# Patient Record
Sex: MALE | Race: BLACK OR AFRICAN AMERICAN | Employment: UNEMPLOYED | URBAN - METROPOLITAN AREA
[De-identification: names, ages, dates, MRNs, and addresses within clinical notes are randomized per-mention and may not be internally consistent; named-entity substitution may affect disease eponyms.]

---
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
## 2020-12-15 ENCOUNTER — HOSPITAL ENCOUNTER (EMERGENCY)
Age: 20
Discharge: HOME | End: 2020-12-15
Attending: EMERGENCY MEDICINE

## 2020-12-15 VITALS
DIASTOLIC BLOOD PRESSURE: 77 MMHG | RESPIRATION RATE: 16 BRPM | SYSTOLIC BLOOD PRESSURE: 128 MMHG | HEIGHT: 75 IN | HEART RATE: 86 BPM | OXYGEN SATURATION: 99 % | BODY MASS INDEX: 28.1 KG/M2 | WEIGHT: 225.97 LBS | TEMPERATURE: 99.1 F

## 2020-12-15 LAB
BILIRUBIN URINE: NEGATIVE
COLOR: ABNORMAL
COMMENT UA: ABNORMAL
GLUCOSE URINE: NEGATIVE
HIV AG/AB: NONREACTIVE
KETONES, URINE: NEGATIVE
LEUKOCYTE ESTERASE, URINE: NEGATIVE
NITRITE, URINE: NEGATIVE
PH UA: 6 (ref 5–8)
PROTEIN UA: NEGATIVE
SPECIFIC GRAVITY UA: 1.03 (ref 1–1.03)
T. PALLIDUM, IGG: NONREACTIVE
TURBIDITY: ABNORMAL
URINE HGB: NEGATIVE
UROBILINOGEN, URINE: NORMAL

## 2020-12-15 PROCEDURE — 86780 TREPONEMA PALLIDUM: CPT

## 2020-12-15 PROCEDURE — 6360000002 HC RX W HCPCS: Performed by: STUDENT IN AN ORGANIZED HEALTH CARE EDUCATION/TRAINING PROGRAM

## 2020-12-15 PROCEDURE — 87389 HIV-1 AG W/HIV-1&-2 AB AG IA: CPT

## 2020-12-15 PROCEDURE — 96372 THER/PROPH/DIAG INJ SC/IM: CPT

## 2020-12-15 PROCEDURE — 81001 URINALYSIS AUTO W/SCOPE: CPT

## 2020-12-15 PROCEDURE — 99284 EMERGENCY DEPT VISIT MOD MDM: CPT

## 2020-12-15 PROCEDURE — 87591 N.GONORRHOEAE DNA AMP PROB: CPT

## 2020-12-15 PROCEDURE — 6370000000 HC RX 637 (ALT 250 FOR IP): Performed by: STUDENT IN AN ORGANIZED HEALTH CARE EDUCATION/TRAINING PROGRAM

## 2020-12-15 PROCEDURE — 87491 CHLMYD TRACH DNA AMP PROBE: CPT

## 2020-12-15 RX ORDER — AZITHROMYCIN 250 MG/1
1000 TABLET, FILM COATED ORAL ONCE
Status: COMPLETED | OUTPATIENT
Start: 2020-12-15 | End: 2020-12-15

## 2020-12-15 RX ORDER — CEFTRIAXONE SODIUM 250 MG/1
250 INJECTION, POWDER, FOR SOLUTION INTRAMUSCULAR; INTRAVENOUS ONCE
Status: COMPLETED | OUTPATIENT
Start: 2020-12-15 | End: 2020-12-15

## 2020-12-15 RX ORDER — METRONIDAZOLE 500 MG/1
2000 TABLET ORAL ONCE
Status: COMPLETED | OUTPATIENT
Start: 2020-12-15 | End: 2020-12-15

## 2020-12-15 RX ADMIN — METRONIDAZOLE 2000 MG: 500 TABLET, FILM COATED ORAL at 23:32

## 2020-12-15 RX ADMIN — CEFTRIAXONE SODIUM 250 MG: 250 INJECTION, POWDER, FOR SOLUTION INTRAMUSCULAR; INTRAVENOUS at 22:30

## 2020-12-15 RX ADMIN — AZITHROMYCIN 1000 MG: 250 TABLET, FILM COATED ORAL at 22:29

## 2020-12-16 LAB
-: NORMAL
AMORPHOUS: NORMAL
BACTERIA: NORMAL
C. TRACHOMATIS DNA ,URINE: NEGATIVE
CASTS UA: NORMAL /LPF (ref 0–8)
CRYSTALS, UA: NORMAL /HPF
EPITHELIAL CELLS UA: NORMAL /HPF (ref 0–5)
MUCUS: NORMAL
N. GONORRHOEAE DNA, URINE: NEGATIVE
OTHER OBSERVATIONS UA: NORMAL
RBC UA: NORMAL /HPF (ref 0–4)
RENAL EPITHELIAL, UA: NORMAL /HPF
SPECIMEN DESCRIPTION: NORMAL
TRICHOMONAS: NORMAL
WBC UA: NORMAL /HPF (ref 0–5)
YEAST: NORMAL

## 2020-12-16 NOTE — ED PROVIDER NOTES
[facility]     Emergency Department     Faculty Note/ Attestation      Pt Name: Colin Linares                                       MRN: [id number]  [place] 2000  Date of evaluation: 12/15/2020    Patients PCP:    No primary care provider on file. Attestation  I performed a history and physical examination of the patient and discussed management with the resident. I reviewed the residents note and agree with the documented findings and plan of care. Any areas of disagreement are noted on the chart. I was personally present for the key portions of any procedures. I have documented in the chart those procedures where I was not present during the key portions. I have reviewed the emergency nurses triage note. I agree with the chief complaint, past medical history, past surgical history, allergies, medications, social and family history as documented unless otherwise noted below. For Physician Assistant/ Nurse Practitioner cases/documentation I have personally evaluated this patient and have completed at least one if not all key elements of the E/M (history, physical exam, and MDM). Additional findings are as noted. Initial Screens:        [place] Coma Scale  Eye Opening: Spontaneous  Best Verbal Response: Oriented  Best Motor Response: Obeys commands  Eric Coma Scale Score: 15    Vitals:    Vitals:    12/15/20 2053 12/15/20 2113   BP:  128/77   Pulse:  86   Resp:  16   Temp: 97.2 °F (36.2 °C) 99.1 °F (37.3 °C)   TempSrc: Temporal Oral   SpO2:  99%   Weight:  225 lb 15.5 oz (102.5 kg)   Height:  6' 3\" (1.905 m)       CHIEF COMPLAINT       Chief Complaint   Patient presents with    Exposure to STD     Exposed to HIV wants to get tested       Patient is a 78-year-old male sexually active with men and on prophylaxis for HIV.   Patient notes he wants to get tested for all STDs and treated concern for HIV currently denying any symptoms of pain burning discomfort with urination no ulcers but does have bruises on the skin and has experienced candida in the past    DIAGNOSTIC RESULTS     RADIOLOGY:   No orders to display       LABS:  Labs Reviewed - No data to display    EMERGENCY DEPARTMENT COURSE:     -------------------------  BP: 128/77, Temp: 99.1 °F (37.3 °C), Pulse: 86, Resp: 16  Physical Exam  Constitutional:       Appearance: He is well-developed. He is not diaphoretic. HENT:      Head: Normocephalic and atraumatic. Right Ear: External ear normal.      Left Ear: External ear normal.   Eyes:      General: No scleral icterus. Right eye: No discharge. Left eye: No discharge. Neck:      Musculoskeletal: Normal range of motion. Trachea: No tracheal deviation. Pulmonary:      Effort: Pulmonary effort is normal. No respiratory distress. Breath sounds: No stridor. Musculoskeletal: Normal range of motion. Skin:     General: Skin is warm and dry. Neurological:      Mental Status: He is alert and oriented to person, place, and time. Coordination: Coordination normal.   Psychiatric:      Comments: Depressed mood no SI           Comments  Patient arrived hemodynamically stable and in no acute distress. Concern for STD given patient's presentation. No evidence of systemic disease. Patient requests treatment at this time and was be treated with Ceftriaxone and Azithromycin. Importance of informing partners discussed at length. Reasons to return to the ED, potential complications, expected course of the patient's condition, and recommended treatments were discussed with the patient. The patient was also instructed to follow up with their primary care physician and/or any appropriate specialists. The patient expressed understanding and agreement, and will be discharged in good condition. Oates DO, RDMS.   Attending Emergency Physician          Shreya Israel DO  12/15/20 0181

## 2020-12-16 NOTE — ED PROVIDER NOTES
file     Relationship status: Not on file    Intimate partner violence     Fear of current or ex partner: Not on file     Emotionally abused: Not on file     Physically abused: Not on file     Forced sexual activity: Not on file   Other Topics Concern    Not on file   Social History Narrative    Not on file       Family History   Problem Relation Age of Onset    Arthritis Mother     Asthma Mother     Diabetes Mother     High Cholesterol Mother     Stroke Mother     High Blood Pressure Father     Heart Disease Father     Substance Abuse Father         crack    Depression Maternal Grandmother     Substance Abuse Maternal Grandfather         crack    Diabetes Paternal Grandmother     Heart Disease Paternal Grandmother     High Blood Pressure Paternal Grandmother     High Cholesterol Paternal Grandmother     Heart Disease Paternal Grandfather     Cancer Paternal Grandfather     Cancer Other         prostate     Cancer Other         ovarian    Kidney Disease Neg Hx         Allergies:  Patient has no known allergies. Home Medications:  Prior to Admission medications    Medication Sig Start Date End Date Taking? Authorizing Provider   ketoconazole (NIZORAL) 2 % cream Apply topically daily. 12/16/16   Baltazar Nickels, DO   fluticasone propionate (FLOVENT DISKUS) 100 MCG/BLIST AEPB Inhale 1 puff into the lungs 2 times daily 1/9/16   Fish Burr APRN - CNP   fluticasone [facility]) 50 MCG/ACT nasal spray 1 spray by Nasal route daily 1/9/16   Fish Burr APRN - CNP   albuterol sulfate  (90 BASE) MCG/ACT inhaler Inhale 2 puffs into the lungs every 6 hours as needed for Wheezing 1/9/16   Fish Burr APRN - CNP   loratadine (CLARITIN) 10 MG tablet Take 1 tablet by mouth daily 5/9/15   CARLITO Mccoy - CNP       REVIEW OFSYSTEMS    (2-9 systems for level 4, 10 or more for level 5)      Review of Systems   Constitutional: Negative for chills and fever.    Respiratory: Negative for shortness of breath. Cardiovascular: Negative for chest pain. Gastrointestinal: Negative for abdominal pain. Genitourinary: Negative for discharge, dysuria and testicular pain. PHYSICAL EXAM   (up to 7 for level 4, 8 or more forlevel 5)      INITIAL VITALS:   ED Triage Vitals   BP Temp Temp Source Pulse Resp SpO2 Height Weight   12/15/20 2113 12/15/20 2053 12/15/20 2053 12/15/20 2113 12/15/20 2113 12/15/20 2113 12/15/20 2113 12/15/20 2113   128/77 97.2 °F (36.2 °C) Temporal 86 16 99 % 6' 3\" (1.905 m) 225 lb 15.5 oz (102.5 kg)       Physical Exam  Constitutional:       General: He is not in acute distress. Appearance: He is well-developed. He is not diaphoretic. Pulmonary:      Effort: Pulmonary effort is normal.   Abdominal:      General: There is no distension. Palpations: Abdomen is soft. Tenderness: There is no abdominal tenderness. There is no guarding. Genitourinary:     Penis: Normal.       Comments: Uncircumcised, no testicular TTP, no rashes or lesions   Musculoskeletal:      Comments:     Skin:     General: Skin is warm. Neurological:      Mental Status: He is alert and oriented to person, place, and time. DIFFERENTIAL  DIAGNOSIS     PLAN (LABS / IMAGING / EKG):  Orders Placed This Encounter   Procedures    C.trachomatis N.gonorrhoeae DNA, Urine    T. pallidum Ab    HIV Screen    Urinalysis Reflex to Culture    Microscopic Urinalysis       MEDICATIONS ORDERED:  Orders Placed This Encounter   Medications    azithromycin (ZITHROMAX) tablet 1,000 mg     Order Specific Question:   Antimicrobial Indications     Answer:   STD infection    cefTRIAXone (ROCEPHIN) injection 250 mg     Order Specific Question:   Antimicrobial Indications     Answer:   STD infection    metroNIDAZOLE (FLAGYL) tablet 2,000 mg     Order Specific Question:   Antimicrobial Indications     Answer:   STD infection         Initial MDM/Plan: 21 y.o. male who presents with STD exposure.   Vital signs stable on arrival.  Plan for empiric treatment with Rocephin, azithromycin and Flagyl. Will get urinalysis.  exam unremarkable. DIAGNOSTIC RESULTS / EMERGENCYDEPARTMENT COURSE / MDM     LABS:  Labs Reviewed   URINE RT REFLEX TO CULTURE - Abnormal; Notable for the following components:       Result Value    Color, UA DARK YELLOW (*)     Turbidity UA TURBID (*)     All other components within normal limits   C.TRACHOMATIS N.GONORRHOEAE DNA, URINE   T. PALLIDUM AB   HIV SCREEN   MICROSCOPIC URINALYSIS         RADIOLOGY:  No results found. EKG      All EKG's are interpreted by the Emergency Department Physicianwho either signs or Co-signs this chart in the absence of a cardiologist.    EMERGENCY DEPARTMENT COURSE:      Patient was given empiric treatment for gonorrhea, chlamydia and trichomoniasis. The patient was informed that the hospital will call if we have plenty of samples are positive. Patient was instructed to follow-up with a primary care physician for further testing. PROCEDURES:  None    CONSULTS:  None    CRITICAL CARE:  Please see attending note    FINAL IMPRESSION      1. Exposure to STD          DISPOSITION / PLAN     DISPOSITION Decision To Discharge 12/15/2020 11:31:21 PM      PATIENT REFERRED TO:  No follow-up provider specified.     DISCHARGE MEDICATIONS:  Discharge Medication List as of 12/15/2020 11:34 PM          Kitty Haley MD  Emergency Medicine Resident    (Please note that portions of this note were completed with a voice recognition program.Efforts were made to edit the dictations but occasionally words are mis-transcribed.)        Kitty Haley MD  Resident  12/17/20 [id number]

## 2020-12-16 NOTE — ED NOTES
KAMILA met with Martina Alan who presented in ED with C/O STD exposures   SW provided Mart[facility] Alan with [facility], also discussed community support      Shayna Guzman, [facility]  12/15/20 [id number]

## 2020-12-17 ASSESSMENT — ENCOUNTER SYMPTOMS
ABDOMINAL PAIN: 0
SHORTNESS OF BREATH: 0